# Patient Record
Sex: FEMALE | URBAN - METROPOLITAN AREA
[De-identification: names, ages, dates, MRNs, and addresses within clinical notes are randomized per-mention and may not be internally consistent; named-entity substitution may affect disease eponyms.]

---

## 2017-09-06 ENCOUNTER — IMPORTED ENCOUNTER (OUTPATIENT)
Dept: URBAN - METROPOLITAN AREA CLINIC 1 | Facility: CLINIC | Age: 57
End: 2017-09-06

## 2017-09-06 PROBLEM — H40.013: Noted: 2017-09-06

## 2017-09-06 PROBLEM — H25.813: Noted: 2017-09-06

## 2017-09-06 PROBLEM — Z79.84: Noted: 2017-09-06

## 2017-09-06 PROBLEM — E11.9: Noted: 2017-09-06

## 2017-09-06 PROBLEM — H04.123: Noted: 2017-09-06

## 2017-09-06 PROCEDURE — 92133 CPTRZD OPH DX IMG PST SGM ON: CPT

## 2017-09-06 PROCEDURE — 92015 DETERMINE REFRACTIVE STATE: CPT

## 2017-09-06 PROCEDURE — 92014 COMPRE OPH EXAM EST PT 1/>: CPT

## 2017-09-06 NOTE — PATIENT DISCUSSION
1.  DM Type II without sign of diabetic retinopathy and no blot heme on dilated retinal examination today OU No Macular Edema:  Discussed the pathophysiology of diabetes and its effect on the eye and risk of blindness. Stressed the importance of strong glucose control. Advised of importance of at least yearly dilated examinations but to contact us immediately for any problems or concerns. 2. Type II diabetes controlled by oral medications. 3.  COAG Suspect OU: (0.60/0.60)  IOP was stable today. Condition was discussed with patient. Will monitor patient for progression. OCT was done today results was normal 4. Cataract OU: Observe for now without intervention. The patient was advised to contact us if any change or worsening of vision5. Dry Eyes OU -- Recommended to patient to use Artificial Tears BID OU6. Return for an appointment in 1 year for 30 and OCT. with Dr. Yuri Carbajal.

## 2022-04-02 ASSESSMENT — VISUAL ACUITY
OS_SC: 20/30
OD_SC: 20/30
OS_CC: J3
OD_CC: J3

## 2022-04-02 ASSESSMENT — TONOMETRY
OD_IOP_MMHG: 16
OS_IOP_MMHG: 16